# Patient Record
Sex: MALE | NOT HISPANIC OR LATINO | URBAN - NONMETROPOLITAN AREA
[De-identification: names, ages, dates, MRNs, and addresses within clinical notes are randomized per-mention and may not be internally consistent; named-entity substitution may affect disease eponyms.]

---

## 2018-09-19 ENCOUNTER — OFFICE VISIT CONVERTED (OUTPATIENT)
Dept: FAMILY MEDICINE CLINIC | Age: 28
End: 2018-09-19
Attending: NURSE PRACTITIONER

## 2020-03-30 ENCOUNTER — OFFICE VISIT CONVERTED (OUTPATIENT)
Dept: FAMILY MEDICINE CLINIC | Age: 30
End: 2020-03-30
Attending: NURSE PRACTITIONER

## 2021-06-05 NOTE — PROGRESS NOTES
Devin Esparza  1990     Office/Outpatient Visit    Visit Date: Mon, Mar 30, 2020 03:31 pm    Provider: Marie Martinez N.P. (Assistant: Steffi Mendoza MA)    Location: [facility]        Electronically signed by Marie Martinez N.P. on  03/30/2020 04:26:36 PM                             Subjective:        CC: Mr. Esparza is a 30 year old White male.  Patient was needing paperwork filled out due to hospital quarantine.          HPI: Attempted call at 1551.         30 year old male presenting for TELEHEALTH visit requesting FMLA paperwork filled out. He states he was in the ED at [facility] on the 23rd and was dx with FLU B and strep. He has completed abx and states that he feels much better. He has been off work since ER visit. Pt is to go back to work in 3 days on the 2nd of April.    ROS:     CONSTITUTIONAL:  Negative for chills, fatigue and fever.      EYES:  Negative for blurred vision and eye drainage.      E/N/T:  Negative for diminished hearing, nasal congestion, frequent rhinorrhea, sinus pressure and sore throat.      CARDIOVASCULAR:  Negative for chest pain, dizziness and palpitations.      RESPIRATORY:  Negative for recent cough and dyspnea.      GASTROINTESTINAL:  Negative for abdominal pain, diarrhea, nausea and vomiting.      MUSCULOSKELETAL:  Negative for myalgias.      INTEGUMENTARY:  Negative for rash.      NEUROLOGICAL:  Negative for weakness.          Past Medical History / Family History / Social History:         Last Reviewed on 3/30/2020 03:51 PM by Marie Martinez    Past Medical History:             PAST MEDICAL HISTORY         Positive for    Gastroesophageal Reflux Disease;     Positive for    anxiety with previous gerd and took buspar;         PREVENTIVE HEALTH MAINTENANCE             EYE EXAM: was last done 2017 glasses and contacts         Surgical History:         widom tooth extraction;    pilonidal cyst removal;         Family History:         Positive  for Hypertension ( father; mother ).      Positive for Lung Cancer ( mat. uncle -- smoker ) and Pancreatric Cancer ( mat. uncle ).      Positive for Alzheimer's Disease ( mat. GF ).          Social History:     Occupation: [employer] and [employer]     Marital Status: Single     Children: 1 child and has baby due in 2018         Tobacco/Alcohol/Supplements:     Last Reviewed on 3/30/2020 03:51 PM by Marie Martinez    Tobacco: He has a past history of cigarette smoking; quit date:  2007.   Currently uses smokeless tobacco.          Immunizations:     Hep A, adult dose 5/1/2018        Allergies:     Last Reviewed on 3/30/2020 03:51 PM by Marie Martinez    Penicillins: anaphylaxis         Current Medications:     Last Reviewed on 3/30/2020 03:51 PM by Marie Martinez    albuterol sulfate 90 mcg/actuation Inhalation HFA Aerosol Inhaler [inhale 1 - 2 puffs (90 - 180 mcg) by inhalation route every 4 hours as needed]    brompheniramine-pseudoeph-DM 2-30-10 mg/5 mL oral Syrup [take 10 milliliters by oral route every 4 hours]        Assessment:         J10.89   Influenza due to other identified influenza virus with other manifestations           Plan:         Influenza due to other identified influenza virus with other manifestationsWill send for ED visit and patient will bring paperwork to office for FMLA. Pt to notify clinic of any acute concerns or issues. Pt v/u and had no further questions upon d/c.             Charge Capture:         Primary Diagnosis:     J10.89  Influenza due to other identified influenza virus with other manifestations           Orders:      25360  Office/outpatient visit; established patient, level 2  (In-House)

## 2021-06-05 NOTE — PROGRESS NOTES
Devin Esparza 1990     Office/Outpatient Visit    Visit Date: Wed, Sep 19, 2018 11:13 am    Provider: Rosita Traylor N.P. (Assistant: Yoli Chambers MA)    Location: [facility]        Electronically signed by Rosita Traylor N.P. on  09/20/2018 08:27:03 AM                             SUBJECTIVE:        CC:     Mr. Esparza is a 28 year old male.  This is his first visit to the clinic.  very anxious,[id number] , unable to sleep.          HPI:         Patient to be evaluated for health checkup.  He cannot recall when he last had a physical exam.  His last ECG was 1 year ago and was normal.   He's had vision screening done 1 year ago and this was abnormal, but is corrected with glasses.  He is current with his Td immunization.      Smoking status: The patient uses smokeless tobacco.          PHQ-9 Depression Screening: Completed form scanned and in chart; Total Score 17 Alcohol Consumption Screening: Completed form scanned and in chart; Total Score 2         Concerning back pain, the discomfort is most prominent in the lower lumbar spine.  This radiates to the right anterior thigh.  This is a chronic, but intermittent problem with an acute exacerbation.  He states that the current episode of pain started 2 months ago.  He does not recall any precipitating event or injury.  Aggravating factors contributing to the back pain may be lifting.  Associated symptoms include radicular right leg pain.  Other details: xray at [facility] within last 1-2 yrs.  working second job x 2 months may be contributor.          In regard to the anxiety with depression, his anxiety disorder was originally diagnosed 3 years ago.  His symptom complex includes apprehension, a choking or smothering sensation, feeling of impending doom, and hyperventilation.  True panic attacks apparently do not occur.  The frequency symptoms is nearly every day.  There are no apparent triggers that increase the anxiety.  He is not currently  being treated for anxiety.  Previous attempts at treatment have included BuSpar.  He denies pertinent past medical history.  Family history is negative for psychiatric disorders.  previous dose of buspirone was 20 mg tid and lost effectiveness.      ROS:     CONSTITUTIONAL:  Positive for fatigue.   Negative for fever.      CARDIOVASCULAR:  Negative for chest pain, palpitations, tachycardia, orthopnea, and edema.      RESPIRATORY:  Negative for cough, dyspnea, and hemoptysis.      GASTROINTESTINAL:  Negative for abdominal pain, heartburn, constipation, diarrhea, and stool changes.      GENITOURINARY:  Negative for dysuria, genital lesions, hematuria, impotence, polyuria, and changes in urine stream.      MUSCULOSKELETAL:  Positive for back pain ( acute; recurrent ).      INTEGUMENTARY:  Negative for rash.      NEUROLOGICAL:  Negative for dizziness, headaches, paresthesias, and weakness.      ENDOCRINE:  Negative for hair loss, heat/cold intolerance, polydipsia, and polyphagia.      PSYCHIATRIC:  Positive for anxiety, depression, feelings of stress and insomnia.   Negative for suicidal thoughts.          PMH/FMH/SH:     Last Reviewed on 9/19/2018 12:15 PM by Rosita Traylor    Past Medical History:             PAST MEDICAL HISTORY         Positive for    Gastroesophageal Reflux Disease;     Positive for    anxiety with previous gerd and took buspar;         PREVENTIVE HEALTH MAINTENANCE             EYE EXAM: was last done 2017 glasses and contacts         Surgical History:         widom tooth extraction;    pilonidal cyst removal;         Family History:         Positive for Hypertension ( father; mother ).      Positive for Lung Cancer ( mat. uncle -- smoker ) and Pancreatric Cancer ( mat. uncle ).      Positive for Alzheimer's Disease ( mat. GF ).          Social History:     Occupation: [employer] and [employer]     Marital Status: Single     Children: 1 child and has baby due in 2018          Tobacco/Alcohol/Supplements:     Last Reviewed on 9/20/2018 08:15 AM by Rosita Traylor    Tobacco: He has a past history of cigarette smoking; quit date:  2007.   Currently uses smokeless tobacco.              Current Problems:       None Recorded         Immunizations:     None        Allergies:     Penicillins: anaphylaxis        Current Medications:     Last Reviewed on 9/19/2018 11:16 AM by Yoli Chambers      No Known Medications.         OBJECTIVE:        Vitals:         Current: 9/19/2018 11:22:32 AM    Ht:  6 ft, 2 in;  Wt: 370.8 lbs;  BMI: 47.6    T: 98.7 F;  BP: 152/81 mm Hg (right arm, sitting);  P: 94 bpm (right arm (BP Cuff), sitting)        Exams:     PHYSICAL EXAM:     GENERAL: morbidly obese;  well groomed;  no apparent distress;     RESPIRATORY: normal respiratory rate and pattern with no distress; normal breath sounds with no rales, rhonchi, wheezes or rubs;     CARDIOVASCULAR: normal rate; rhythm is regular;     Peripheral Pulses: posterior tibial: 2+ amplitude, no bruits; no edema;     MUSCULOSKELETAL: pain with range of motion in: back flexion;     NEUROLOGIC: mental status: alert and oriented x 3; GROSSLY INTACT     PSYCHIATRIC:  appropriate affect and demeanor; normal speech pattern; grossly normal memory;         ASSESSMENT           V70.0   Z00.01  Health checkup              DDx:     V79.0   Z13.89  Screening for depression              DDx:     724.5   M54.5  Back pain              DDx:     300.4   F41.9  Anxiety with depression              DDx:         ORDERS:         Meds Prescribed:       Sertraline HCl 50mg Tablet 1/2 a day for first 4 DAYS then one a day  #30 (Thirty) tablet(s) Refills: 1       Skelaxin (Metaxalone) 800mg Tablet 1 tablet bid prn muscle spasm  #30 (Thirty) tablet(s) Refills: 0         Lab Orders:       14981  BDCB2 - [facility] CBC w/o diff  (Send-Out)         20445  COMP - [facility] Comp. Metabolic Panel  (Send-Out)         60303  DP - [facility] Lipid Panel  (Send-Out)          93942  [facility] TSH  (Send-Out)         APPTO  Appointment need  (In-House)           Other Orders:         Depression screen positive and follow up plan documented  (In-House)                   PLAN:          Health checkup     LABORATORY:  Labs ordered to be performed today include CBC W/O DIFF, Comprehensive metabolic panel, lipid panel, and TSH.      FOLLOW-UP: Schedule a follow-up visit in 1 month..  follow up luz marina depression           Orders:       48739  BDCB2 - [facility] CBC w/o diff  (Send-Out)         34409  COMP - [facility] Comp. Metabolic Panel  (Send-Out)         38038  LPDP - [facility] Lipid Panel  (Send-Out)         13800  TSH [facility] TSH  (Send-Out)         APPTO  Appointment need  (In-House)             Patient Education Handouts:       Physical Exam 20-29 year, Male           Screening for depression     MIPS PHQ-9 Depression Screening: Completed form scanned and in chart; Total Score 17 Positive Depression Screen: Suicide Risk Assessment completed--denies suicidal/homicidal ideation; Pharmacologic intervention initiated/modified           Orders:         Depression screen positive and follow up plan documented  (In-House)            Back pain [facility] xray         RECOMMENDATIONS given include: alternating cold packs and moist heat, massage, and feels all of his symptoms are due to second job with [employer] and not getting a day off.  skelaxin helped best previously.  advised work up of back pain.  xray, conservative tx, PT and then MRI if not improving.  get xray report from [facility].  skelaxin may cause drowsiness.  consider PT..            Prescriptions:       Skelaxin (Metaxalone) 800mg Tablet 1 tablet bid prn muscle spasm  #30 (Thirty) tablet(s) Refills: 0          Anxiety with depression         RECOMMENDATIONS given include: avoidance of caffeine, stress reduction, and to ER if suicidal ideation.  follow up one month..            Prescriptions:       Sertraline HCl 50mg Tablet 1/2 a day for  first 4 DAYS then one a day  #30 (Thirty) tablet(s) Refills: 1           Patient Education Handouts:       Depression (Depressive Disorder)        Generalized Anxiety Disorder        M[facility] Health [facility] Substance Abuse [facility] in [facility]        Panic Attack              Patient Recommendations:        For  Health checkup:     Schedule a follow-up visit in 1 month.                APPOINTMENT INFORMATION:        Monday Tuesday Wednesday Thursday Friday Saturday Sunday            Time:___________________AM  PM   Date:_____________________         For  Back pain:     I also recommend feels all of his symptoms are due to second job with [employer] and not getting a day off.  skelaxin helped best previously.  advised work up of back pain.  xray, conservative tx, PT and then MRI if not improving.  get xray report from [facility].  skelaxin may cause drowsiness.  consider PT..          For  Anxiety with depression:     Try to avoid or reduce the amount of caffeine intake. Try stress reduction methods to reduce the frequency or lessen the severity of anxiety episodes.              CHARGE CAPTURE           **Please note: ICD descriptions below are intended for billing purposes only and may not represent clinical diagnoses**        Primary Diagnosis:         V70.0 Health checkup            Z00.01    Encounter for general adult medical exam w abnormal findings              Orders:          83187   Preventive medicine, new patient, age 18-39 years  (In-House)             APPTO   Appointment need  (In-House)           V79.0 Screening for depression            Z13.89    Encounter for screening for other disorder              Orders:          12979 -25  Office visit - new pt, level 2  (In-House)                Depression screen positive and follow up plan documented  (In-House)           724.5 Back pain            M54.5    Low back pain    300.4 Anxiety with depression            F41.9    Anxiety disorder,  unspecified

## 2021-07-01 VITALS
BODY MASS INDEX: 40.43 KG/M2 | HEART RATE: 94 BPM | HEIGHT: 74 IN | SYSTOLIC BLOOD PRESSURE: 152 MMHG | DIASTOLIC BLOOD PRESSURE: 81 MMHG | TEMPERATURE: 98.7 F | WEIGHT: 315 LBS

## 2022-04-29 ENCOUNTER — TELEPHONE (OUTPATIENT)
Dept: FAMILY MEDICINE CLINIC | Age: 32
End: 2022-04-29

## 2022-04-29 NOTE — TELEPHONE ENCOUNTER
Caller: Devin Esparza    Relationship: Self    Best call back number: [phone]    What is the best time to reach you: ANYTIME    Who are you requesting to speak with (clinical staff, provider,  specific staff member): CLINICAL      What was the call regarding: THE PATIENT STATED HE IS HAVING LOWER BACK PAIN ON ALL THREE SCIATIC NERVES AND IS UNABLE TO PUT PRESSURE ON HIS LEGS. HE WOULD LIKE A CALL BACK TO DISCUSS PCP RECOMMENDATION    Do you require a callback: YES

## 2022-05-02 NOTE — TELEPHONE ENCOUNTER
Spoke with pt and offered him an appt today.  [place] and [place] not know when he will be back in town.  I informed pt he would have to been seen in order to get treatment.  It has been 2 years since he was seen.  States he will make an appt when he gets back into town.  He will go to the nearest hospital for now.